# Patient Record
Sex: FEMALE | Race: WHITE | Employment: FULL TIME | URBAN - METROPOLITAN AREA
[De-identification: names, ages, dates, MRNs, and addresses within clinical notes are randomized per-mention and may not be internally consistent; named-entity substitution may affect disease eponyms.]

---

## 2017-08-15 ENCOUNTER — HOSPITAL ENCOUNTER (EMERGENCY)
Age: 42
Discharge: HOME | End: 2017-08-15
Attending: EMERGENCY MEDICINE
Payer: SELF-PAY

## 2017-08-15 VITALS
HEART RATE: 74 BPM | TEMPERATURE: 98.2 F | WEIGHT: 160.72 LBS | RESPIRATION RATE: 18 BRPM | SYSTOLIC BLOOD PRESSURE: 129 MMHG | HEIGHT: 66 IN | DIASTOLIC BLOOD PRESSURE: 97 MMHG | BODY MASS INDEX: 25.83 KG/M2 | OXYGEN SATURATION: 100 %

## 2017-08-15 DIAGNOSIS — T14.8XXA ABRASION: ICD-10-CM

## 2017-08-15 DIAGNOSIS — S00.83XA CONTUSION OF OTHER PART OF HEAD, INITIAL ENCOUNTER: Primary | ICD-10-CM

## 2017-08-15 PROCEDURE — 90471 IMMUNIZATION ADMIN: CPT

## 2017-08-15 PROCEDURE — 99283 EMERGENCY DEPT VISIT LOW MDM: CPT

## 2017-08-15 PROCEDURE — 74011250637 HC RX REV CODE- 250/637: Performed by: EMERGENCY MEDICINE

## 2017-08-15 PROCEDURE — 90715 TDAP VACCINE 7 YRS/> IM: CPT | Performed by: EMERGENCY MEDICINE

## 2017-08-15 PROCEDURE — 74011250636 HC RX REV CODE- 250/636: Performed by: EMERGENCY MEDICINE

## 2017-08-15 RX ORDER — ACETAMINOPHEN 325 MG/1
325 TABLET ORAL
Status: COMPLETED | OUTPATIENT
Start: 2017-08-15 | End: 2017-08-15

## 2017-08-15 RX ADMIN — ACETAMINOPHEN 325 MG: 325 TABLET ORAL at 07:23

## 2017-08-15 RX ADMIN — TETANUS TOXOID, REDUCED DIPHTHERIA TOXOID AND ACELLULAR PERTUSSIS VACCINE, ADSORBED 0.5 ML: 5; 2.5; 8; 8; 2.5 SUSPENSION INTRAMUSCULAR at 07:05

## 2017-08-15 NOTE — ED PROVIDER NOTES
HPI Comments: Lisa Andrade is a 42 y.o. female who presents ambulatory to ED [facility] ED with CC of left sided head and face pain after falling out of bed and hitting her head on an end table ~0540 this morning. She reports small abrasion over location of her pain, and states her pain mildly radiates to her left neck. Pt reports she was sleeping at her sister's house, and believes she fell out of the bed because she was unfamiliar with the height of the mattress. Pt denies LOC following fall, states she recalls the entire event, and has been ambulatory since incident. She states she had mild nausea en route to ED today, which has since resolved. She denies taking anticoagulants. Pt states she is unsure of the status of her Tetanus immunization. She specifically denies any fevers, chills, vomiting, chest pain, shortness of breath, rash, diarrhea, sweating or weight loss. PCP: Lawton Primrose, MD    There are no other complaints, changes, or physical findings at this time. The history is provided by the patient. No past medical history on file. No past surgical history on file. No family history on file. Social History     Social History    Marital status:      Spouse name: N/A    Number of children: N/A    Years of education: N/A     Occupational History    Not on file. Social History Main Topics    Smoking status: Not on file    Smokeless tobacco: Not on file    Alcohol use Not on file    Drug use: Not on file    Sexual activity: Not on file     Other Topics Concern    Not on file     Social History Narrative         ALLERGIES: Pcn [penicillins] and Sulfa (sulfonamide antibiotics)    Review of Systems   Constitutional: Negative. Negative for activity change, appetite change, chills, fatigue, fever and unexpected weight change. HENT: Negative for congestion, hearing loss, rhinorrhea, sneezing and voice change. +left face pain   Eyes: Negative.   Negative for pain and visual disturbance. Respiratory: Negative. Negative for apnea, cough, choking, chest tightness and shortness of breath. Cardiovascular: Negative. Negative for chest pain and palpitations. Gastrointestinal: Positive for nausea (resolved). Negative for abdominal distention, abdominal pain, blood in stool, diarrhea and vomiting. Genitourinary: Negative. Negative for difficulty urinating, flank pain, frequency and urgency. No discharge   Musculoskeletal: Negative. Negative for arthralgias, back pain, myalgias and neck stiffness. Skin: Negative. Negative for color change and rash.        +abrasion left face   Neurological: Positive for headaches (left). Negative for dizziness, seizures, syncope, speech difficulty, weakness and numbness. -LOC   Hematological: Negative for adenopathy. Psychiatric/Behavioral: Negative. Negative for agitation, behavioral problems, dysphoric mood and suicidal ideas. The patient is not nervous/anxious. Physical Exam   Constitutional: She is oriented to person, place, and time. She appears well-developed and well-nourished. No distress. HENT:   Head: Normocephalic. Head is without Rodgers's sign. Right Ear: Tympanic membrane normal. No hemotympanum. Left Ear: Tympanic membrane normal. No hemotympanum. Mouth/Throat: Oropharynx is clear and moist. No oropharyngeal exudate. 1 cm x 1 cm abrasion to left temple area; no malalignment of teeth; no evidence of TMJ dislocation or fx   Eyes: Conjunctivae and EOM are normal. Pupils are equal, round, and reactive to light. Right eye exhibits no discharge. Left eye exhibits no discharge. Neck: Normal range of motion. Neck supple. Cardiovascular: Normal rate, regular rhythm and intact distal pulses. Exam reveals no gallop and no friction rub. No murmur heard. Pulmonary/Chest: Effort normal and breath sounds normal. No respiratory distress. She has no wheezes. She has no rales.  She exhibits no tenderness. Abdominal: Soft. Bowel sounds are normal. She exhibits no distension and no mass. There is no tenderness. There is no rebound and no guarding. Musculoskeletal: Normal range of motion. She exhibits no edema. Lymphadenopathy:     She has no cervical adenopathy. Neurological: She is alert and oriented to person, place, and time. No cranial nerve deficit. Coordination normal.   Skin: Skin is warm and dry. No rash noted. No erythema. Psychiatric: She has a normal mood and affect. Nursing note and vitals reviewed. MDM  Number of Diagnoses or Management Options  Abrasion:   Contusion of other part of head, initial encounter:   Diagnosis management comments: DDx: contusion, abrasion       Amount and/or Complexity of Data Reviewed  Review and summarize past medical records: yes      ED Course       Procedures    Chief Complaint   Patient presents with    Head Injury     pt fell out of bed and hit the lateral left side of face. No LOC. A/Ox4.        6:36 AM  The patients presenting problems have been discussed, and they are in agreement with the care plan formulated and outlined with them. I have encouraged them to ask questions as they arise throughout their visit. MEDICATIONS GIVEN:  Medications   acetaminophen (TYLENOL) tablet 325 mg (not administered)   diph,Pertuss(AC),Tet Vac-PF (BOOSTRIX) suspension 0.5 mL (0.5 mL IntraMUSCular Given 8/15/17 0705)       VITAL SIGNS:  Patient Vitals for the past 12 hrs:   Temp Pulse Resp BP SpO2   08/15/17 0652 - - - (!) 129/97 -   08/15/17 0640 98.2 °F (36.8 °C) 74 18 133/84 100 %     DIAGNOSIS:    1. Contusion of other part of head, initial encounter    2.  Abrasion        PLAN:  Follow-up Information     Follow up With Details Comments Contact Info    Ector Renee MD Call As needed, If symptoms worsen [facility]             ED COURSE: The patients hospital course has been uncomplicated. 7:00 AM  Richard Garcia  results have been reviewed with her. She has been counseled regarding her diagnosis. She verbally conveys understanding and agreement of the signs, symptoms, diagnosis, treatment and prognosis and additionally agrees to follow up as recommended with Dr. Eddie Wilson MD in 24 - 48 hours. She also agrees with the care-plan and conveys that all of her questions have been answered. I have also put together some discharge instructions for her that include: 1) educational information regarding their diagnosis, 2) how to care for their diagnosis at home, as well a 3) list of reasons why they would want to return to the ED prior to their follow-up appointment, should their condition change. This note is prepared by Tamera Gibbons, acting as Scribe for [employer] Braden Forrest, [address] [facility] Braden Forrest MD: The scribe's documentation has been prepared under my direction and personally reviewed by me in its entirety. I confirm that the note above accurately reflects all work, treatment, procedures, and medical decision making performed by me.

## 2017-08-15 NOTE — ED NOTES
Instructions provided by Dr. Gerry Castellon. Ice pack provided and wound cleansed. Pt discharged home ambulatory.

## 2017-08-15 NOTE — DISCHARGE INSTRUCTIONS
Scrapes (Abrasions): Care Instructions  Your Care Instructions  Scrapes (abrasions) are wounds where your skin has been rubbed or torn off. Most scrapes do not go deep into the skin, but some may remove several layers of skin. Scrapes usually don't bleed much, but they may ooze pinkish fluid. Scrapes on the head or face may appear worse than they are. They may bleed a lot because of the good blood supply to this area. Most scrapes heal well and may not need a bandage. They usually heal within 3 to 7 days. A large, deep scrape may take 1 to 2 weeks or longer to heal. A scab may form on some scrapes. Follow-up care is a key part of your treatment and safety. Be sure to make and go to all appointments, and call your doctor if you are having problems. It's also a good idea to know your test results and keep a list of the medicines you take. How can you care for yourself at home? · If your doctor told you how to care for your wound, follow your doctor's instructions. If you did not get instructions, follow this general advice:  ¨ Wash the scrape with clean water 2 times a day. Don't use hydrogen peroxide or alcohol, which can slow healing. ¨ You may cover the scrape with a thin layer of petroleum jelly, such as Vaseline, and a nonstick bandage. ¨ Apply more petroleum jelly and replace the bandage as needed. · Prop up the injured area on a pillow anytime you sit or lie down during the next 3 days. Try to keep it above the level of your heart. This will help reduce swelling. · Be safe with medicines. Take pain medicines exactly as directed. ¨ If the doctor gave you a prescription medicine for pain, take it as prescribed. ¨ If you are not taking a prescription pain medicine, ask your doctor if you can take an over-the-counter medicine. When should you call for help?   Call your doctor now or seek immediate medical care if:  · You have signs of infection, such as:  ¨ Increased pain, swelling, warmth, or redness around the scrape. ¨ Red streaks leading from the scrape. ¨ Pus draining from the scrape. ¨ A fever. · The scrape starts to bleed, and blood soaks through the bandage. Oozing small amounts of blood is normal.  Watch closely for changes in your health, and be sure to contact your doctor if the scrape is not getting better each day. Where can you learn more? Go to http://jannet-kaleb.info/. Enter A374 in the search box to learn more about \"Scrapes (Abrasions): Care Instructions. \"  Current as of: March 20, 2017  Content Version: 11.3  © 7896-3904 [employer]. Care instructions adapted under license by [employer] (which disclaims liability or warranty for this information). If you have questions about a medical condition or this instruction, always ask your healthcare professional. [place] [address] any warranty or liability for your use of this information. Bruises: Care Instructions  Your Care Instructions    Bruises occur when small blood vessels under the skin tear or rupture, most often from a twist, bump, or fall. Blood leaks into tissues under the skin and causes a black-and-blue spot that often turns colors, including purplish black, reddish blue, or yellowish green, as the bruise heals. Bruises hurt, but most are not serious and will go away on their own within 2 to 4 weeks. Sometimes, gravity causes them to spread down the body. A leg bruise usually will take longer to heal than a bruise on the face or arms. Follow-up care is a key part of your treatment and safety. Be sure to make and go to all appointments, and call your doctor if you are having problems. Its also a good idea to know your test results and keep a list of the medicines you take. How can you care for yourself at home? · Take pain medicines exactly as directed. ¨ If the doctor gave you a prescription medicine for pain, take it as prescribed.   ¨ If you are not taking a prescription pain medicine, ask your doctor if you can take an over-the-counter medicine. · Put ice or a cold pack on the area for 10 to 20 minutes at a time. Put a thin cloth between the ice and your skin. · If you can, prop up the bruised area on pillows as much as possible for the next few days. Try to keep the bruise above the level of your heart. When should you call for help? Call your doctor now or seek immediate medical care if:  · You have signs of infection, such as:  ¨ Increased pain, swelling, warmth, or redness. ¨ Red streaks leading from the bruise. ¨ Pus draining from the bruise. ¨ A fever. · You have a bruise on your leg and signs of a blood clot, such as:  ¨ Increasing redness and swelling along with warmth, tenderness, and pain in the bruised area. ¨ Pain in your calf, back of the knee, thigh, or groin. ¨ Redness and swelling in your leg or groin. · Your pain gets worse. Watch closely for changes in your health, and be sure to contact your doctor if:  · You do not get better as expected. Where can you learn more? Go to http://jannet-kaleb.info/. Enter [phone] [phone] in the search box to learn more about \"Bruises: Care Instructions. \"  Current as of: March 20, 2017  Content Version: 11.3  © 6931-3944 [employer]. Care instructions adapted under license by [employer] (which disclaims liability or warranty for this information). If you have questions about a medical condition or this instruction, always ask your healthcare professional. [place] [address] any warranty or liability for your use of this information. Concussion: Care Instructions  Your Care Instructions    A concussion is a kind of injury to the brain. It happens when the head receives a hard blow. The impact can jar or shake the brain against the skull. This interrupts the brain's normal activities.  Although you may have cuts or bruises on your head or face, you may have no other visible signs of a brain injury. In most cases, damage to the brain from a concussion can't be seen in tests such as a CT or MRI scan. For a few weeks, you may have low energy, dizziness, trouble sleeping, a headache, ringing in your ears, or nausea. You may also feel anxious, grumpy, or depressed. You may have problems with memory and concentration. These symptoms are common after a concussion. They should slowly improve over time. Sometimes this takes weeks or even months. Someone who lives with you should know how to care for you. Please share this and all information with a caregiver who will be available to help if needed. Follow-up care is a key part of your treatment and safety. Be sure to make and go to all appointments, and call your doctor if you are having problems. It's also a good idea to know your test results and keep a list of the medicines you take. How can you care for yourself at home? Pain control  · Put ice or a cold pack on the part of your head that hurts for 10 to 20 minutes at a time. Put a thin cloth between the ice and your skin. · Be safe with medicines. Read and follow all instructions on the label. ¨ If the doctor gave you a prescription medicine for pain, take it as prescribed. ¨ If you are not taking a prescription pain medicine, ask your doctor if you can take an over-the-counter medicine. Recovery  · Follow your doctor's instructions. He or she will tell you if you need someone to watch you closely for the next 24 hours or longer. · Rest is the best way to recover from a concussion. You need to rest your body and your brain:  ¨ Get plenty of sleep at night. And take rest breaks during the day. ¨ Avoid activities that take a lot of physical or mental work. This includes housework, exercise, schoolwork, video games, text messaging, and using the computer. ¨ You may need to change your school or work schedule while you recover.   ¨ Return to your normal activities slowly. Do not try to do too much at once. · Do not drink alcohol or use illegal drugs. Alcohol and illegal drugs can slow your recovery. And they can increase your risk of a second brain injury. · Avoid activities that could lead to another concussion. Follow your doctor's instructions for a gradual return to activity and sports. · Ask your doctor when it's okay for you to drive a car, ride a bike, or operate machinery. How should you return to activity? Your return to sports or activity should be gradual. It should only begin when all symptoms of a concussion are gone, both while at rest and during exercise or exertion. Doctors and concussion specialists suggest steps to follow for returning to sports after a concussion. Use these steps as a guide. In most places, your doctor must give you written permission for your child to begin the steps and return to sports. You should slowly progress through the following levels of activity:  1. No activity. This means complete physical and mental rest.  2. Light aerobic activity. This can include walking, swimming, or other exercise at less than 70% of maximum heart rate. No resistance training is included in this step. 3. Sport-specific exercise. This includes running drills or skating drills (depending on the sport), but no head impact. 4. Noncontact training drills. This includes more complex training drills such as passing. The athlete may also begin light resistance training. 5. Full-contact practice. The athlete can participate in normal training. 6. Return to normal game play. This is the final step and allows the athlete to join in normal game play. Watch and keep track of your progress. It should take at least 6 days for you to go from light activity to normal game play. Make sure that you can stay at each new level of activity for at least 24 hours without symptoms, or as long as your doctor says, before doing more.  If one or more symptoms come back, return to a lower level of activity for at least 24 hours. Don't move on until all symptoms are gone. When should you call for help? Call 911 anytime you think you may need emergency care. For example, call if:  · You have a seizure. · You passed out (lost consciousness). · You are confused or can't stay awake. Call your doctor now or seek immediate medical care if:  · You have new or worse vomiting. · You feel less alert. · You have new weakness or numbness in any part of your body. Watch closely for changes in your health, and be sure to contact your doctor if:  · You do not get better as expected. · You have new symptoms, such as headaches, trouble concentrating, or changes in mood. Where can you learn more? Go to http://jannet-kaleb.info/. Enter F075 in the search box to learn more about \"Concussion: Care Instructions. \"  Current as of: September 20, 2016  Content Version: 11.3  © 4578-1138 [employer]. Care instructions adapted under license by [employer] (which disclaims liability or warranty for this information). If you have questions about a medical condition or this instruction, always ask your healthcare professional. [address] any warranty or liability for your use of this information.

## 2017-11-22 ENCOUNTER — OFFICE VISIT (OUTPATIENT)
Dept: ORTHOPEDIC SURGERY | Age: 42
End: 2017-11-22

## 2017-11-22 VITALS
RESPIRATION RATE: 18 BRPM | OXYGEN SATURATION: 99 % | TEMPERATURE: 98.2 F | SYSTOLIC BLOOD PRESSURE: 118 MMHG | DIASTOLIC BLOOD PRESSURE: 94 MMHG | HEIGHT: 66 IN | HEART RATE: 92 BPM

## 2017-11-22 DIAGNOSIS — M79.671 RIGHT FOOT PAIN: ICD-10-CM

## 2017-11-22 DIAGNOSIS — M77.41 METATARSALGIA OF RIGHT FOOT: Primary | ICD-10-CM

## 2017-11-22 RX ORDER — LEVOTHYROXINE AND LIOTHYRONINE 57; 13.5 UG/1; UG/1
TABLET ORAL DAILY
COMMUNITY

## 2017-11-22 NOTE — PATIENT INSTRUCTIONS
Please follow up after MRI. You are advised to contact us if your condition worsens. You have been provided with an order for durable medical equipment that you may  at an outside facility as our office does not carry the equipment you need. You may pick it up at any medical supply company you like. Listed below are a few different locations for your convenience:    [facility]  [facility], [address]  Phone: [phone]    [address] [place], [address] [facility]  Phone: [phone] [facility]  Phone: [phone]  [place]:   [address] Rayshawnsteve Mcdonald  [place], [address] [place]:  [address], [address]  [place]:  [facility],[address] [place], [address]    Metatarsalgia: Care Instructions  Your Care Instructions    Metatarsalgia (say \"met-uh-tar-SAL-nedra-\") is pain in the ball of the foot. It sometimes spreads to the toes. The ball of the foot is the bottom of the foot, where the toes join the foot. While walking might be very painful, the pain is usually not a sign of a serious or permanent problem. But any pain can affect your life, so it is important that you treat it. Pain in this area can be caused by many things. For example, you may have this pain if you stand or walk a lot or wear tight shoes or high heels. Your pain might be caused by inflammation of a joint (capsulitis). It is most common in the joint at the base of the second toe, near the ball of the foot. Capsulitis happens when ligaments that go around the joint become inflamed. The joint may be swollen. It may feel like there is a small rock under it. You may have had an X-ray if your doctor wanted to make sure a more serious problem is not causing your pain. Treatment may consist of home care, such as rest, wearing different shoes, and taking over-the-counter pain medicines.  It can take months for the pain to go away.  If the ligaments around a joint are torn, or if a toe has started to slant toward the toe next to it, you may need surgery. Follow-up care is a key part of your treatment and safety. Be sure to make and go to all appointments, and call your doctor if you are having problems. It's also a good idea to know your test results and keep a list of the medicines you take. How can you care for yourself at home? · Rest your foot. If an activity is causing the pain, find another one to do that does not put so much pressure on your foot. · Put ice or a cold pack on your foot when it hurts or after you've done something that usually causes pain. Do this for 10 to 20 minutes at a time. Put a thin cloth between the ice and your skin. · Take an over-the-counter pain medicine, such as acetaminophen (Tylenol), ibuprofen (Advil, Motrin), or naproxen (Aleve). Be safe with medicines. Read and follow all instructions on the label. · Do not take two or more pain medicines at the same time unless the doctor told you to. Many pain medicines have acetaminophen, which is Tylenol. Too much acetaminophen (Tylenol) can be harmful. · Wear roomy, comfortable shoes. · If your doctor recommends it, use special pads to relieve the pressure on your foot. The pads may fit into your shoes, or they may stick to the soles of your feet. · Ask your doctor about using orthotic shoe devices. These are molded pieces of rubber, leather, metal, plastic, or other synthetic material that are inserted into a shoe. · Wear shoes with good arch support. · Try not to wear high heels or narrow shoes. · Follow your doctor's or physical therapist's directions for exercise. When should you call for help? Watch closely for changes in your health, and be sure to contact your doctor if:  ? · You have new or worse symptoms. ? · You do not get better as expected. Where can you learn more? Go to http://jannet-kaleb.info/.   Enter W777 in the search box to learn more about \"Metatarsalgia: Care Instructions. \"  Current as of: March 21, 2017  Content Version: 11.4  © 0001-5819 [employer]. Care instructions adapted under license by [employer] (which disclaims liability or warranty for this information). If you have questions about a medical condition or this instruction, always ask your healthcare professional. [place] [address] any warranty or liability for your use of this information.

## 2017-11-22 NOTE — PROCEDURES
RADIOGRAPHIC INTERPRETATION    The impression for this/these radiograph(s) will be found in the office visit progress note for this encounter from 11/22/2017.     Venkatesh Franco MD  11/22/2017  10:54 AM

## 2017-11-22 NOTE — PROGRESS NOTES
AMBULATORY PROGRESS NOTE      Patient: Sol Bourgeois             MRN: [id number]     SSN: [id number] There is no height or weight on file to calculate BMI. YOB: 1975     AGE: 42 y.o. EX: female    PCP: Ruthie Bedoya MD    IMPRESSION/DIAGNOSIS AND TREATMENT PLAN     DIAGNOSES  1. Metatarsalgia of right foot    2. Right foot pain        Orders Placed This Encounter    AMB SUPPLY ORDER    [05807] Foot Min 3V    MRI FOOT RT WO CONT      Sol Bourgeois understands her diagnoses and the proposed plan. My overall impression in this individual is a 57-year-old female who has had right forefoot pain for many months now. Despite shoe wear changes, activity modification, she is still having pain and discomfort to her right forefoot. My impression is a right number two toe metatarsalgia, but I am also concerned about impending stress fracture to the second metatarsal distal one-third region. So, I am recommending an MRI. She has a fullness also to the plantar portion of her foot corresponding to the second metatarsal and has associated bunion, but this fullness is thicker more so and more convex on the right side compared to the more normal side on her left forefoot. So, the MRI would also allow me to see whether there is a concomitant soft tissue mass plantar and adjacent to the second metatarsal.  Indeed, the MRI is an item of medical necessity in this individual, a non-contrasting MRI of her right forefoot to assess the second metatarsal joint, second metatarsal bone, as well as the soft tissues around this area. Plan:    1) DME Order: Custom Trilaminar orthotic inserts with a metatarsal pad  2) MRI without IV Contrast of Right Foot    RTO - After MRI    HPI AND EXAMINATION     Sol Bourgeois IS A 42 y.o. female who presents to my outpatient office complaining of right foot pain. Patient states that she experiences pain along the right forefoot.  She likes to run for exercise. Last week she tried running but could not get over 1 mile without noticing discomfort to her right forefoot. Previously she was able to run around 15 miles per week about 4 months ago. A MRI has been ordered to better assess her condition, but the patient has been provided an order for custom trilaminar orthotic inserts to have after reviewing the MRI. 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 Talia Marlow is alert/oriented (name, location, time) and follows commands well. she  is in no acute distress and her affect and mood are appropriate. DIAGNOSTIC STUDIES:  Her x-rays, today, three views of the right foot, AP, lateral, and oblique, nonweightbearing, show she has a bunion deformity. There is no subluxation or dislocation of the right great toe and/or right number two toe MTP joint. There are no osteolytic or osteoblastic lesions seen and no fracture, subluxation, or dislocation. There are no calcific bone spurs to the inferior and/or superior surface of the calcaneus in the lateral radiographic x-ray. The lateral x-ray is a weightbearing film. There is some slight extension posture to the number two toe in the lateral weightbearing film. The AP film, I am uncertain whether this is a weightbearing film, and I do not think it is a weightbearing film of this right foot, but there is more of a bunion on the right compared to that on the left side. The sesamoids are malpositioned. Bilateral ANKLE and FOOT       Gait: normal  Tenderness: mild tenderness to the plantar forefoot laterally . Cutaneous: No rashes, skin patches, wounds, or abrasions to the lower legs           Warm and Normal color. No regions of expressible drainage.            Medial Border of Tibia Region: absent           Skin color, texture, turgor normal. Normal.           Fullness along the lateral plantar forefoot  Joint Motion: ROM Ankle:Normal , Hindfoot: (ST,TN,CC Normal}, Forefoot toes:Normal    Left: Laxity of first metatarsal ray  1st TMT is present  Neurologic Exam: Neuro: Motor: normal 5/5 strength in all tested muscle groups and Sensory : no sensory deficits noted. Contractures: Gastrocnemius or Achilles Contractures absent  Joint / Tendon Stability: No Ankle or Subtalar instability or joint laxity. No peroneal sublux ability or dislocation  Alignment:  Normal Foot Alignment and  Flexible             Bilateral varus alignment of number 2 toe. R > L            Bilateral bunions L > R  Vascular: Normal Pulses/ NL Capillary refill, No evidence of DVT seen on physical exam.   No calf swelling, no tenderness to calf muscles. Lymphatic:  No Evidence of Lymphedema. CHART REVIEW     Past Medical History:   Diagnosis Date    Thyroid activity decreased      Current Outpatient Prescriptions   Medication Sig    thyroid, Pork, (ARMOUR THYROID) 90 mg tablet Take  by mouth daily.  Cetirizine (ZYRTEC) 10 mg cap Take  by mouth. No current facility-administered medications for this visit. Allergies   Allergen Reactions    Pcn [Penicillins] Itching    Sulfa (Sulfonamide Antibiotics) Angioedema     History reviewed. No pertinent surgical history. Social History     Occupational History    Not on file. Social History Main Topics    Smoking status: Never Smoker    Smokeless tobacco: Never Used    Alcohol use Yes    Drug use: Not on file    Sexual activity: Not on file     Family History   Problem Relation Age of Onset    No Known Problems Mother     No Known Problems Father        REVIEW OF SYSTEMS : 11/22/2017  ALL BELOW ARE Negative except : SEE HPI       Constitutional: Negative for fever, chills and weight loss. Neg Weigh Loss  Cardiovascular: Negative for chest pain, claudication and leg swelling.  SOB, RAPP   Gastrointestinal: Negative for  pain, N/V/D/C, Blood in stool or urine,dysuria, hematuria,        Incontinence, pelvic pain  Musculoskeletal: see HPI. Neurological: Negative for dizziness and weakness. Negative for headaches,Visual Changes, Confusion, Seizures,   Psychiatric/Behavioral: Negative for depression, memory loss and substance abuse. Extremities:  Negative for  hair changes, rash or skin lesion changes. Hematologic: Negative for Bleeding problems, bruising, pallor or swollen lymph nodes. Peripheral Vascular: No calf pain, vascular vein tenderness to calf pain              No calf throbbing, posterior knee throbbing pain    DIAGNOSTIC IMAGING     RADIOGRAPHIC INTERPRETATION    The impression for this/these radiograph(s) will be found in the office visit progress note for this encounter from 11/22/2017. Sharmila Murrieta MD  11/22/2017  10:54 AM          Written by Celena Madison, as dictated by Drew Mooney MD. IDr., Drew Mooney MD, confirm that all documentation is accurate.

## 2017-12-13 ENCOUNTER — HOSPITAL ENCOUNTER (OUTPATIENT)
Age: 42
Discharge: HOME | End: 2017-12-13
Attending: ORTHOPAEDIC SURGERY
Payer: COMMERCIAL

## 2017-12-13 DIAGNOSIS — M79.671 RIGHT FOOT PAIN: ICD-10-CM

## 2017-12-13 DIAGNOSIS — M77.41 METATARSALGIA OF RIGHT FOOT: ICD-10-CM

## 2017-12-13 PROCEDURE — 73720 MRI LWR EXTREMITY W/O&W/DYE: CPT

## 2017-12-13 PROCEDURE — A9585 GADOBUTROL INJECTION: HCPCS | Performed by: ORTHOPAEDIC SURGERY

## 2017-12-13 PROCEDURE — 74011250636 HC RX REV CODE- 250/636: Performed by: ORTHOPAEDIC SURGERY

## 2017-12-13 RX ADMIN — GADOBUTROL 7.5 ML: 604.72 INJECTION INTRAVENOUS at 12:00

## 2017-12-20 ENCOUNTER — OFFICE VISIT (OUTPATIENT)
Dept: ORTHOPEDIC SURGERY | Age: 42
End: 2017-12-20

## 2017-12-20 VITALS — BODY MASS INDEX: 25.71 KG/M2 | WEIGHT: 160 LBS | HEIGHT: 66 IN

## 2017-12-20 DIAGNOSIS — M77.41 METATARSALGIA OF RIGHT FOOT: ICD-10-CM

## 2017-12-20 DIAGNOSIS — M77.51 BURSITIS OF RIGHT FOOT: Primary | ICD-10-CM

## 2017-12-20 DIAGNOSIS — M79.671 RIGHT FOOT PAIN: ICD-10-CM

## 2017-12-20 RX ORDER — TRIAMCINOLONE ACETONIDE 40 MG/ML
40 INJECTION, SUSPENSION INTRA-ARTICULAR; INTRAMUSCULAR ONCE
Qty: 1 ML | Refills: 0
Start: 2017-12-20 | End: 2017-12-20

## 2017-12-20 RX ORDER — LIDOCAINE HYDROCHLORIDE 10 MG/ML
INJECTION, SOLUTION EPIDURAL; INFILTRATION; INTRACAUDAL; PERINEURAL
Qty: 1.5 ML | Refills: 0
Start: 2017-12-20

## 2017-12-20 NOTE — PROCEDURES
PROCEDURE       [facility] PROCEDURE OUTPATIENT PROCEDURE    PROCEDURE:  Injection of the RIGHT 1ST INTERSTITIAL WEB SPACE BURSAE        I, Marvin Gómez MD, have reviewed the History, Physical and updated the Allergic reactions for [address] performed immediately prior to start of procedure:       * Patient was identified by name Alice Madera and date of birth (1975)  * Agreement on procedure being performed was verified  * Risks and Benefits explained to the patient: see below  * Procedure site verified and marked as necessary  * Patient was positioned for comfort  * Verbal Consent and Written Consent Verified by myself and my office staff. * Complications: None  *  All patient and/or family questions answered     The procedure was explained to the patient and possible adverse reactions were discussed. These risks included, but not limited to: bleeding, infection, septic arthritis, local skin irritation (skin discoloration, hyperpigmentation, hypopigmentation, thinning of the skin, development of a wound, skin necrosis), synovitis, subcutaneous fat pad atrophy, subcutaneous abscess, tendon rupture, transient hyperglycemia. Infection may occur requiring surgical debridement and hospitalization. All Diabetics instructed to check serum blood sugar levels 3 times a day (day of the injection) due to hyperglycemia induced from cortisone injections. If serum blood sugar level > 250 mg%, Tila Wright instructed to call PCP to determine if any additional measures are needed. Time: 8:22 AM  Date of procedure: 12/20/2017  Procedure performed by: Marvin Gómez MD  Provider assisted by:  MA  Patient accompanied by: self  How tolerated by patient: tolerated the procedure well with no complications  Comments: none    The RIGHT 1ST INTERSTITIAL WEB SPACE BURSAE  area was prepped and cleansed with: sterile fashion using a following solution:  Betadine.  The injection was administered:  A solution of 20 mg of Kenalog and 1.5 ml of 1% plain lidocaine% plain was used. Post injection instructions were given to Betty Roa: Remove the band aid in 3 hours, Wash site with clean soap, No further dressings there after. Call Noemi Caraballo  [phone] if any: pain, redness, drainage, fever, or any concerns/questions that Betty Roa may have regarding the injection. Betty Roa was advised on the signs of infection and instructed to go to the ER if it is office after hours. Angelique Wright tolerated the injection quite well.     Noemi Caraballo MD MD  8:22 AM

## 2017-12-20 NOTE — PROGRESS NOTES
AMBULATORY PROGRESS NOTE      Patient: Tootie Garcia             MRN: [id number]     SSN: [id number] Body mass index is 25.82 kg/(m^2). YOB: 1975     AGE: 36 y.o. EX: female    PCP: Neetu Castillo MD    IMPRESSION/DIAGNOSIS AND TREATMENT PLAN     DIAGNOSES  1. Bursitis of right foot    2. Metatarsalgia of right foot    3. Right foot pain        Orders Placed This Encounter    INJECT TENDON SHEATH/LIGAMENT    TRIAMCINOLONE ACETONIDE INJ    triamcinolone acetonide (KENALOG) 40 mg/mL injection    lidocaine, PF, (XYLOCAINE) 10 mg/mL (1 %) injection      Tootie Garcia understands her diagnoses and the proposed plan. I showed her the MRI, at least pertinent images thereof, and it does show a fluid collection is her first interdigital web space that is affecting the first web space, and it is adjacent to the second metatarsal head, and then is swings down to the plantar portion of the second metatarsal head region. This is the direct region where she has tenderness. Her MRI did not show a stress fracture. It did not show any Freibergs infarction. Because of her continued pain and discomfort in this region, recommendation is for a cortisone injection to this region. A cortisone injection was performed as listed as below. The plan is to see her in one months time. I did recommend OBEO shoes, as well as a prescription for a custom orthotic. Her examination is as below. There is no instability of the number two toe MTP region. Plan:    1) Cortisone Injection to the Right First Web Space plantar aspect: 0.5 mL Kenalog and 1.5 mL of Lidocaine  2)  No running, no lunging, and no plyometric type activities. Just walking for now. RTO - 4 weeks    HPI AND EXAMINATION     Tootie Garcia IS A 36 y.o. female who presents to my outpatient office for follow up of metatarsalgia of the right foot.  At last visit, I provided an order for a custom trilaminar orthotic inserts with a metatarsal pad to be used after, and ordered a MRI of the right foot. The patient presents to the office today reporting worsening pain along her metatarsals. Patient states that she has been unable walk barefooted without discomfort. She notes that on the last Sunday she noticed moderate swelling to her right plantar forefoot and more pain than usual. At that time she took Aleve which provided relief. Bilateral ANKLE and FOOT        Gait: normal  Tenderness: Mild tenderness to the right first web space along the 2nd metatarsal head. Cutaneous: No rashes, skin patches, wounds, or abrasions to the lower legs                                Warm and Normal color. No regions of expressible drainage. Medial Border of Tibia Region: absent                                Skin color, texture, turgor normal. Normal.                                Fullness along the lateral plantar forefoot  Joint Motion: ROM Ankle:Normal , Hindfoot: (ST,TN,CC Normal}, Forefoot toes:Normal                                              Left: Laxity of first metatarsal ray  1st TMT is present  Neurologic Exam: Neuro: Motor: normal 5/5 strength in all tested muscle groups and Sensory : no sensory deficits noted. Contractures: Gastrocnemius or Achilles Contractures absent  Joint / Tendon Stability: No Ankle or Subtalar instability or joint laxity. No peroneal sublux ability or dislocation  Alignment:  Normal Foot Alignment and  Flexible                                  Bilateral varus alignment of number 2 toe. R > L                                 Bilateral bunions L > R  Vascular: Normal Pulses/ NL Capillary refill, No evidence of DVT seen on physical exam.                        No calf swelling, no tenderness to calf muscles. Lymphatic:  No Evidence of Lymphedema.     CHART REVIEW     Past Medical History:   Diagnosis Date  Thyroid activity decreased      Current Outpatient Prescriptions   Medication Sig    triamcinolone acetonide (KENALOG) 40 mg/mL injection 1 mL by IntraMUSCular route once for 1 dose.  lidocaine, PF, (XYLOCAINE) 10 mg/mL (1 %) injection 1.5 mL of Lidocaine to first web space plantar aspect    thyroid, Pork, (ARMOUR THYROID) 90 mg tablet Take  by mouth daily.  Cetirizine (ZYRTEC) 10 mg cap Take  by mouth. No current facility-administered medications for this visit. Allergies   Allergen Reactions    Pcn [Penicillins] Itching    Sulfa (Sulfonamide Antibiotics) Angioedema     History reviewed. No pertinent surgical history. Social History     Occupational History    Not on file. Social History Main Topics    Smoking status: Never Smoker    Smokeless tobacco: Never Used    Alcohol use Yes    Drug use: Not on file    Sexual activity: Not on file     Family History   Problem Relation Age of Onset    No Known Problems Mother     No Known Problems Father        REVIEW OF SYSTEMS : 12/20/2017  ALL BELOW ARE Negative except : SEE HPI       Constitutional: Negative for fever, chills and weight loss. Neg Weigh Loss  Cardiovascular: Negative for chest pain, claudication and leg swelling. SOB, RAPP   Gastrointestinal: Negative for  pain, N/V/D/C, Blood in stool or urine,dysuria, hematuria,        Incontinence, pelvic pain  Musculoskeletal: see HPI. Neurological: Negative for dizziness and weakness. Negative for headaches,Visual Changes, Confusion, Seizures,   Psychiatric/Behavioral: Negative for depression, memory loss and substance abuse. Extremities:  Negative for  hair changes, rash or skin lesion changes. Hematologic: Negative for Bleeding problems, bruising, pallor or swollen lymph nodes.   Peripheral Vascular: No calf pain, vascular vein tenderness to calf pain              No calf throbbing, posterior knee throbbing pain    DIAGNOSTIC IMAGING        PROCEDURE       [facility]EDURE OUTPATIENT PROCEDURE    PROCEDURE:  Injection of the RIGHT 1ST INTERSTITIAL WEB SPACE BURSAE        I, Yaneth Redman MD, have reviewed the History, Physical and updated the Allergic reactions for [address] performed immediately prior to start of procedure:       * Patient was identified by name Shireen Munoz and date of birth (1975)  * Agreement on procedure being performed was verified  * Risks and Benefits explained to the patient: see below  * Procedure site verified and marked as necessary  * Patient was positioned for comfort  * Verbal Consent and Written Consent Verified by myself and my office staff. * Complications: None  *  All patient and/or family questions answered     The procedure was explained to the patient and possible adverse reactions were discussed. These risks included, but not limited to: bleeding, infection, septic arthritis, local skin irritation (skin discoloration, hyperpigmentation, hypopigmentation, thinning of the skin, development of a wound, skin necrosis), synovitis, subcutaneous fat pad atrophy, subcutaneous abscess, tendon rupture, transient hyperglycemia. Infection may occur requiring surgical debridement and hospitalization. All Diabetics instructed to check serum blood sugar levels 3 times a day (day of the injection) due to hyperglycemia induced from cortisone injections. If serum blood sugar level > 250 mg%, Tila Wright instructed to call PCP to determine if any additional measures are needed. Time: 8:22 AM  Date of procedure: 12/20/2017  Procedure performed by: Yaneth Redman MD  Provider assisted by:  MA  Patient accompanied by: self  How tolerated by patient: tolerated the procedure well with no complications  Comments: none    The RIGHT 1ST INTERSTITIAL WEB SPACE BURSAE  area was prepped and cleansed with: sterile fashion using a following solution:  Betadine.  The injection was administered:  A solution of 20 mg of Kenalog and 1.5 ml of 1% plain lidocaine% plain was used. Post injection instructions were given to Sol Bourgeois: Remove the band aid in 3 hours, Wash site with clean soap, No further dressings there after. Call Loy Seth  [phone] if any: pain, redness, drainage, fever, or any concerns/questions that Sol Esperanzakelsey may have regarding the injection. Solfaye Matakelsey was advised on the signs of infection and instructed to go to the ER if it is office after hours. Savanna Wright tolerated the injection quite well. Loy Seth MD MD  8:22 AM       MRI Results (most recent):    Results from [place] encounter on 12/13/17   MRI FOOT RT W  WO CONT   Narrative EXAM: MRI of the right foot with and without contrast     INDICATION: Metatarsalgia of the second toe. TECHNIQUE: MRI of the right foot with and without contrast. Multiplanar  multisequence MR imaging obtained pre and post IV contrast.    IV Contrast: 7.5 cc Gadavist    COMPARISON: None    FINDINGS:   Osseous Structures: The marrow signal is unremarkable. No evidence of an acute  fracture. No abnormal marrow signal. No cortical abnormalities appreciated. Osteophyte formation of the first metatarsal is noted. A minute subchondral cyst  is noted in the metatarsal head. Joints/Cartilage: The first MTP joint demonstrates hallux valgus. There is  asymmetric joint space narrowing and mild cartilage thinning. The remainder the  visualized joint spaces are grossly unremarkable. Osteophyte formation is  present. Osteophyte formation and cartilage thinning are noted in the first  metatarsal sesamoid articulations. Ligaments: No acute ligamentous pathology appreciated. Tendons/Muscle: The flexor and extensor tendons are unremarkable. The  musculature demonstrates normal signal.    Soft Tissues/Other:  Between the first and second metatarsal heads, there is a  0.5 x 2.4 x 1.8 cm rim-enhancing bursa. This partially extends inferior to the  2nd metatarsal head. This is concerning for adventitial bursitis. Impression IMPRESSION:   1. Adventitial bursitis adjacent the 2nd metatarsal head. 2.  No evidence of stress edema or stress fracture. 3.  Mild hallux valgus with mild to moderate secondary osteoarthritis and mild  osteoarthritis of the 1st metatarsal sesamoid articulations. Written by Jory Eisenmenger, as dictated by Zafar Arana MD. I, , Zafar Arana MD, confirm that all documentation is accurate.

## 2017-12-20 NOTE — MR AVS SNAPSHOT
Visit Information Date & Time Provider Department Dept. Phone Encounter #  
 12/20/2017  7:35 AM Iris Lou, [address] [facility] [phone] Upcoming Health Maintenance Date Due  
 PAP AKA CERVICAL CYTOLOGY 5/8/2015 Influenza Age 5 to Adult 8/1/2017 DTaP/Tdap/Td series (2 - Td) 8/15/2027 Allergies as of 12/20/2017  Review Complete On: 12/20/2017 By: Iris Lou MD  
  
 Severity Noted Reaction Type Reactions Pcn [Penicillins]  08/15/2017    Itching Sulfa (Sulfonamide Antibiotics)  08/15/2017    Angioedema Current Immunizations  Never Reviewed Name Date Tdap 8/15/2017  7:05 AM  
  
 Not reviewed this visit You Were Diagnosed With   
  
 Codes Comments Metatarsalgia of right foot    -  Primary ICD-10-CM: M77.41 
ICD-9-CM: 726.70 Right foot pain     ICD-10-CM: M79.671 ICD-9-CM: 729.5 Vitals Height(growth percentile) Weight(growth percentile) BMI OB Status Smoking Status 5' 6\" (1.676 m) 160 lb (72.6 kg) 25.82 kg/m2 Chemically Induced Never Smoker BMI and BSA Data Body Mass Index Body Surface Area  
 25.82 kg/m 2 1.84 m 2 Your Updated Medication List  
  
   
This list is accurate as of: 12/20/17  8:15 AM.  Always use your most recent med list.  
  
  
  
  
 ARMOUR THYROID 90 mg tablet Generic drug:  thyroid (Pork) Take  by mouth daily. ZyrTEC 10 mg Cap Generic drug:  Cetirizine Take  by mouth. Patient Instructions Please follow up in 4 weeks. You are advised to contact us if your condition worsens. Please consider purchasing Metatarsalgia: Care Instructions Your Care Instructions Metatarsalgia (say \"met-uh-tar-SAL-nedra-uh\") is pain in the ball of the foot. It sometimes spreads to the toes. The ball of the foot is the bottom of the foot, where the toes join the foot. While walking might be very painful, the pain is usually not a sign of a serious or permanent problem. But any pain can affect your life, so it is important that you treat it. Pain in this area can be caused by many things. For example, you may have this pain if you stand or walk a lot or wear tight shoes or high heels. Your pain might be caused by inflammation of a joint (capsulitis). It is most common in the joint at the base of the second toe, near the ball of the foot. Capsulitis happens when ligaments that go around the joint become inflamed. The joint may be swollen. It may feel like there is a small rock under it. You may have had an X-ray if your doctor wanted to make sure a more serious problem is not causing your pain. Treatment may consist of home care, such as rest, wearing different shoes, and taking over-the-counter pain medicines. It can take months for the pain to go away. If the ligaments around a joint are torn, or if a toe has started to slant toward the toe next to it, you may need surgery. Follow-up care is a key part of your treatment and safety. Be sure to make and go to all appointments, and call your doctor if you are having problems. It's also a good idea to know your test results and keep a list of the medicines you take. How can you care for yourself at home? · Rest your foot. If an activity is causing the pain, find another one to do that does not put so much pressure on your foot. · Put ice or a cold pack on your foot when it hurts or after you've done something that usually causes pain. Do this for 10 to 20 minutes at a time. Put a thin cloth between the ice and your skin. · Take an over-the-counter pain medicine, such as acetaminophen (Tylenol), ibuprofen (Advil, Motrin), or naproxen (Aleve). Be safe with medicines. Read and follow all instructions on the label.  
· Do not take two or more pain medicines at the same time unless the doctor told you to. Many pain medicines have acetaminophen, which is Tylenol. Too much acetaminophen (Tylenol) can be harmful. · Wear roomy, comfortable shoes. · If your doctor recommends it, use special pads to relieve the pressure on your foot. The pads may fit into your shoes, or they may stick to the soles of your feet. · Ask your doctor about using orthotic shoe devices. These are molded pieces of rubber, leather, metal, plastic, or other synthetic material that are inserted into a shoe. · Wear shoes with good arch support. · Try not to wear high heels or narrow shoes. · Follow your doctor's or physical therapist's directions for exercise. When should you call for help? Watch closely for changes in your health, and be sure to contact your doctor if: 
? · You have new or worse symptoms. ? · You do not get better as expected. Where can you learn more? Go to http://jannet-kaleb.info/. Enter L769 in the search box to learn more about \"Metatarsalgia: Care Instructions. \" Current as of: March 21, 2017 Content Version: 11.4 © 9071-1291 Exalt [employer]. Care instructions adapted under license by W[employer] (which disclaims liability or warranty for this information). If you have questions about a medical condition or this instruction, always ask your healthcare professional. [address] any warranty or liability for your use of this information. Metatarsalgia: Care Instructions Your Care Instructions Metatarsalgia (say \"met-uh-tar-SAL-nedra-uh\") is pain in the ball of the foot. It sometimes spreads to the toes. The ball of the foot is the bottom of the foot, where the toes join the foot. While walking might be very painful, the pain is usually not a sign of a serious or permanent problem. But any pain can affect your life, so it is important that you treat it. Pain in this area can be caused by many things. For example, you may have this pain if you stand or walk a lot or wear tight shoes or high heels. Your pain might be caused by inflammation of a joint (capsulitis). It is most common in the joint at the base of the second toe, near the ball of the foot. Capsulitis happens when ligaments that go around the joint become inflamed. The joint may be swollen. It may feel like there is a small rock under it. You may have had an X-ray if your doctor wanted to make sure a more serious problem is not causing your pain. Treatment may consist of home care, such as rest, wearing different shoes, and taking over-the-counter pain medicines. It can take months for the pain to go away. If the ligaments around a joint are torn, or if a toe has started to slant toward the toe next to it, you may need surgery. Follow-up care is a key part of your treatment and safety. Be sure to make and go to all appointments, and call your doctor if you are having problems. It's also a good idea to know your test results and keep a list of the medicines you take. How can you care for yourself at home? · Rest your foot. If an activity is causing the pain, find another one to do that does not put so much pressure on your foot. · Put ice or a cold pack on your foot when it hurts or after you've done something that usually causes pain. Do this for 10 to 20 minutes at a time. Put a thin cloth between the ice and your skin. · Take an over-the-counter pain medicine, such as acetaminophen (Tylenol), ibuprofen (Advil, Motrin), or naproxen (Aleve). Be safe with medicines. Read and follow all instructions on the label. · Do not take two or more pain medicines at the same time unless the doctor told you to. Many pain medicines have acetaminophen, which is Tylenol. Too much acetaminophen (Tylenol) can be harmful. · Wear roomy, comfortable shoes. · If your doctor recommends it, use special pads to relieve the pressure on your foot. The pads may fit into your shoes, or they may stick to the soles of your feet. · Ask your doctor about using orthotic shoe devices. These are molded pieces of rubber, leather, metal, plastic, or other synthetic material that are inserted into a shoe. · Wear shoes with good arch support. · Try not to wear high heels or narrow shoes. · Follow your doctor's or physical therapist's directions for exercise. When should you call for help? Watch closely for changes in your health, and be sure to contact your doctor if: 
? · You have new or worse symptoms. ? · You do not get better as expected. Where can you learn more? Go to http://jannet-kaleb.info/. Enter S216 in the search box to learn more about \"Metatarsalgia: Care Instructions. \" Current as of: March 21, 2017 Content Version: 11.4 © 4125-0201 [employer]bay. Care instructions adapted under license by Syner[employer] (which disclaims liability or warranty for this information). If you have questions about a medical condition or this instruction, always ask your healthcare professional. [place] [address] any warranty or liability for your use of this information. Introducing [facility] & [facility]! [employer] introduces Spotlight patient portal. Now you can access parts of your medical record, email your doctor's office, and request medication refills online. 1. In your internet browser, go to [email or web address] 2. Click on the First Time User? Click Here link in the Sign In box. You will see the New Member Sign Up page. 3. Enter your Spotlight Access Code exactly as it appears below. You will not need to use this code after youve completed the sign-up process. If you do not sign up before the expiration date, you must request a new code.  
 
· Spotlight Access Code: [email or web address] 
 Expires: 2/20/2018 11:16 AM 
 
4. Enter the last four digits of your Social Security Number (xxxx) and Date of Birth (mm/dd/yyyy) as indicated and click Submit. You will be taken to the next sign-up page. 5. Create a Alchip ID. This will be your Alchip login ID and cannot be changed, so think of one that is secure and easy to remember. 6. Create a Alchip password. You can change your password at any time. 7. Enter your Password Reset Question and Answer. This can be used at a later time if you forget your password. 8. Enter your e-mail address. You will receive e-mail notification when new information is available in [address] 9. Click Sign Up. You can now view and download portions of your medical record. 10. Click the Download Summary menu link to download a portable copy of your medical information. If you have questions, please visit the Frequently Asked Questions section of the Alchip website. Remember, Alchip is NOT to be used for urgent needs. For medical emergencies, dial 911. Now available from your iPhone and Android! Please provide this summary of care documentation to your next provider. Your primary care clinician is listed as Tony Mcclure. If you have any questions after today's visit, please call [phone].

## 2017-12-20 NOTE — PATIENT INSTRUCTIONS
Please follow up in 4 weeks. You are advised to contact us if your condition worsens. Please consider purchasing Abeo flip flops. Metatarsalgia: Care Instructions  Your Care Instructions    Metatarsalgia (say \"met-misael-tar-SAL-nedra-misael\") is pain in the ball of the foot. It sometimes spreads to the toes. The ball of the foot is the bottom of the foot, where the toes join the foot. While walking might be very painful, the pain is usually not a sign of a serious or permanent problem. But any pain can affect your life, so it is important that you treat it. Pain in this area can be caused by many things. For example, you may have this pain if you stand or walk a lot or wear tight shoes or high heels. Your pain might be caused by inflammation of a joint (capsulitis). It is most common in the joint at the base of the second toe, near the ball of the foot. Capsulitis happens when ligaments that go around the joint become inflamed. The joint may be swollen. It may feel like there is a small rock under it. You may have had an X-ray if your doctor wanted to make sure a more serious problem is not causing your pain. Treatment may consist of home care, such as rest, wearing different shoes, and taking over-the-counter pain medicines. It can take months for the pain to go away. If the ligaments around a joint are torn, or if a toe has started to slant toward the toe next to it, you may need surgery. Follow-up care is a key part of your treatment and safety. Be sure to make and go to all appointments, and call your doctor if you are having problems. It's also a good idea to know your test results and keep a list of the medicines you take. How can you care for yourself at home? · Rest your foot. If an activity is causing the pain, find another one to do that does not put so much pressure on your foot. · Put ice or a cold pack on your foot when it hurts or after you've done something that usually causes pain.  Do this for 10 to 20 minutes at a time. Put a thin cloth between the ice and your skin. · Take an over-the-counter pain medicine, such as acetaminophen (Tylenol), ibuprofen (Advil, Motrin), or naproxen (Aleve). Be safe with medicines. Read and follow all instructions on the label. · Do not take two or more pain medicines at the same time unless the doctor told you to. Many pain medicines have acetaminophen, which is Tylenol. Too much acetaminophen (Tylenol) can be harmful. · Wear roomy, comfortable shoes. · If your doctor recommends it, use special pads to relieve the pressure on your foot. The pads may fit into your shoes, or they may stick to the soles of your feet. · Ask your doctor about using orthotic shoe devices. These are molded pieces of rubber, leather, metal, plastic, or other synthetic material that are inserted into a shoe. · Wear shoes with good arch support. · Try not to wear high heels or narrow shoes. · Follow your doctor's or physical therapist's directions for exercise. When should you call for help? Watch closely for changes in your health, and be sure to contact your doctor if:  ? · You have new or worse symptoms. ? · You do not get better as expected. Where can you learn more? Go to http://jannet-kaleb.info/. Enter H316 in the search box to learn more about \"Metatarsalgia: Care Instructions. \"  Current as of: March 21, 2017  Content Version: 11.4  © 1889-3290 [employer]. Care instructions adapted under license by Shippter (which disclaims liability or warranty for this information). If you have questions about a medical condition or this instruction, always ask your healthcare professional. [address] any warranty or liability for your use of this information.

## 2018-01-23 ENCOUNTER — OFFICE VISIT (OUTPATIENT)
Dept: ORTHOPEDIC SURGERY | Age: 43
End: 2018-01-23

## 2018-01-23 VITALS
TEMPERATURE: 98 F | WEIGHT: 167 LBS | BODY MASS INDEX: 26.84 KG/M2 | HEIGHT: 66 IN | HEART RATE: 64 BPM | SYSTOLIC BLOOD PRESSURE: 105 MMHG | OXYGEN SATURATION: 99 % | DIASTOLIC BLOOD PRESSURE: 77 MMHG

## 2018-01-23 DIAGNOSIS — M77.51 BURSITIS OF RIGHT FOOT: ICD-10-CM

## 2018-01-23 DIAGNOSIS — M77.41 METATARSALGIA OF RIGHT FOOT: Primary | ICD-10-CM

## 2018-01-23 NOTE — MR AVS SNAPSHOT
[address] [place] [address], [address] [facility] 
[phone] Patient: Kasey Mccauley MRN: [id number] OYI:43/2/1862 Visit Information Date & Time Provider Department Dept. Phone Encounter #  
 1/23/2018  9:10 AM Ofelia Zuluaga MD [place] [facility] [phone] [id number] Upcoming Health Maintenance Date Due  
 PAP AKA CERVICAL CYTOLOGY 5/8/2015 Influenza Age 5 to Adult 8/1/2017 DTaP/Tdap/Td series (2 - Td) 8/15/2027 Allergies as of 1/23/2018  Review Complete On: 1/23/2018 By: Ofelia Zuluaga MD  
  
 Severity Noted Reaction Type Reactions Pcn [Penicillins]  08/15/2017    Itching Sulfa (Sulfonamide Antibiotics)  08/15/2017    Angioedema Current Immunizations  Never Reviewed Name Date Tdap 8/15/2017  7:05 AM  
  
 Not reviewed this visit Vitals BP Pulse Temp Height(growth percentile) Weight(growth percentile) SpO2  
 105/77 (BP 1 Location: Left arm, BP Patient Position: Sitting) 64 98 °F (36.7 °C) 5' 6\" (1.676 m) 167 lb (75.8 kg) 99% BMI OB Status Smoking Status 26.95 kg/m2 Chemically Induced Never Smoker BMI and BSA Data Body Mass Index Body Surface Area  
 26.95 kg/m 2 1.88 m 2 Your Updated Medication List  
  
   
This list is accurate as of: 1/23/18  9:53 AM.  Always use your most recent med list.  
  
  
  
  
 ARMOUR THYROID 90 mg tablet Generic drug:  thyroid (Pork) Take  by mouth daily. lidocaine (PF) 10 mg/mL (1 %) injection Commonly known as:  XYLOCAINE  
1.5 mL of Lidocaine to first web space plantar aspect ZyrTEC 10 mg Cap Generic drug:  Cetirizine Take  by mouth. Patient Instructions Please follow up as needed. You are advised to contact us if your condition worsens. Foot Pain: Care Instructions Your Care Instructions Foot injuries that cause pain and swelling are fairly common. Almost all sports or home repair projects can cause a misstep that ends up as foot pain. Normal wear and tear, especially as you get older, also can cause foot pain. Most minor foot injuries will heal on their own, and home treatment is usually all you need to do. If you have a severe injury, you may need tests and treatment. Follow-up care is a key part of your treatment and safety. Be sure to make and go to all appointments, and call your doctor if you are having problems. It's also a good idea to know your test results and keep a list of the medicines you take. How can you care for yourself at home? · Take pain medicines exactly as directed. ¨ If the doctor gave you a prescription medicine for pain, take it as prescribed. ¨ If you are not taking a prescription pain medicine, ask your doctor if you can take an over-the-counter medicine. · Rest and protect your foot. Take a break from any activity that may cause pain. · Put ice or a cold pack on your foot for 10 to 20 minutes at a time. Put a thin cloth between the ice and your skin. · Prop up the sore foot on a pillow when you ice it or anytime you sit or lie down during the next 3 days. Try to keep it above the level of your heart. This will help reduce swelling. · Your doctor may recommend that you wrap your foot with an elastic bandage. Keep your foot wrapped for as long as your doctor advises. · If your doctor recommends crutches, use them as directed. · Wear roomy footwear. · As soon as pain and swelling end, begin gentle exercises of your foot. Your doctor can tell you which exercises will help. When should you call for help? Call 911 anytime you think you may need emergency care. For example, call if: 
? · Your foot turns pale, white, blue, or cold. ?Call your doctor now or seek immediate medical care if: 
? · You cannot move or stand on your foot. ? · Your foot looks twisted or out of its normal position. ? · Your foot is not stable when you step down. ? · You have signs of infection, such as: 
¨ Increased pain, swelling, warmth, or redness. ¨ Red streaks leading from the sore area. ¨ Pus draining from a place on your foot. ¨ A fever. ? · Your foot is numb or tingly. ? Watch closely for changes in your health, and be sure to contact your doctor if: 
? · You do not get better as expected. ? · You have bruises from an injury that last longer than 2 weeks. Where can you learn more? Go to http://jannet-kaleb.info/. Enter U930 in the search box to learn more about \"Foot Pain: Care Instructions. \" Current as of: March 21, 2017 Content Version: 11.4 © 7084-5713 [employer]. Care instructions adapted under license by Seven Technologies (which disclaims liability or warranty for this information). If you have questions about a medical condition or this instruction, always ask your healthcare professional. [place] [address] any warranty or liability for your use of this information. Introducing [facility] & HEALTH SERVICES! Jana Hdez introduces SnappCloud patient portal. Now you can access parts of your medical record, email your doctor's office, and request medication refills online. 1. In your internet browser, go to [email or web address] 2. Click on the First Time User? Click Here link in the Sign In box. You will see the New Member Sign Up page. 3. Enter your SnappCloud Access Code exactly as it appears below. You will not need to use this code after youve completed the sign-up process. If you do not sign up before the expiration date, you must request a new code. · SnappCloud Access Code: [email or web address] Expires: 2/20/2018 11:16 AM 
 
4. Enter the last four digits of your Social Security Number (xxxx) and Date of Birth (mm/dd/yyyy) as indicated and click Submit.  You will be taken to the next sign-up page. 5. Create a Dynamixyz ID. This will be your Dynamixyz login ID and cannot be changed, so think of one that is secure and easy to remember. 6. Create a Dynamixyz password. You can change your password at any time. 7. Enter your Password Reset Question and Answer. This can be used at a later time if you forget your password. 8. Enter your e-mail address. You will receive e-mail notification when new information is available in [address] 9. Click Sign Up. You can now view and download portions of your medical record. 10. Click the Download Summary menu link to download a portable copy of your medical information. If you have questions, please visit the Frequently Asked Questions section of the Dynamixyz website. Remember, Dynamixyz is NOT to be used for urgent needs. For medical emergencies, dial 911. Now available from your iPhone and Android! Please provide this summary of care documentation to your next provider. Your primary care clinician is listed as Eddie Hayes. If you have any questions after today's visit, please call [phone].

## 2018-01-23 NOTE — PROGRESS NOTES
AMBULATORY PROGRESS NOTE      Patient: Tamera Robert             MRN: [id number]     SSN: [id number] Body mass index is 26.95 kg/(m^2). YOB: 1975     AGE: 36 y.o. EX: female    PCP: Yeny Foote MD    IMPRESSION/DIAGNOSIS AND TREATMENT PLAN     DIAGNOSES  1. Metatarsalgia of right foot    2. Bursitis of right foot        No orders of the defined types were placed in this encounter. Tamera Robert understands her diagnoses and the proposed plan. Plan:    1) Continue activity modification as directed. 2) Follow up with Herbert Oliva to notify us how resuming activities went. RTO - PRN    HPI AND EXAMINATION     Tamera Robert IS A 36 y.o. female who presents to my outpatient office for follow up of metatarsalgia of the right foot. At last visit, I injected Cortisone to the right first webspace. Patient reports that she had some improvement since her last visit. Patient went to a convention around 2 weeks ago. She noticed a sensation of a fullness along the right forefoot. When she wears sandals or platform shoes it tends to do better. However, wearing heels or similar shoes elicits the same sensation and causes discomfort. She inquired about returning to running again, and has been instructed to wear supportive shoes     Bilateral ANKLE and FOOT         Gait: normal  Tenderness: Mild tenderness to the right first web space along the 2nd metatarsal head. Cutaneous: No rashes, skin patches, wounds, or abrasions to the lower legs                                Warm and Normal color.  No regions of expressible drainage.                                Medial Border of Tibia Region: absent                                Skin color, texture, turgor normal. Normal.                                Fullness along the lateral plantar forefoot  Joint Motion: ROM Ankle:Normal , Hindfoot: (ST,TN,CC Normal}, Forefoot toes:Normal                                              Left: Laxity of first metatarsal ray  1st TMT is present  Neurologic Exam: Neuro: Motor: normal 5/5 strength in all tested muscle groups and Sensory : no sensory deficits noted. Contractures: Gastrocnemius or Achilles Contractures absent  Joint / Tendon Stability: No Ankle or Subtalar instability or joint laxity.                                                               No peroneal sublux ability or dislocation  Alignment:  Normal Foot Alignment and  Flexible                                  Bilateral varus alignment of number 2 toe. R > L                                 Bilateral bunions L > R  Vascular: Normal Pulses/ NL Capillary refill, No evidence of DVT seen on physical exam.                        No calf swelling, no tenderness to calf muscles. Lymphatic:  No Evidence of Lymphedema. CHART REVIEW     Past Medical History:   Diagnosis Date    Thyroid activity decreased      Current Outpatient Prescriptions   Medication Sig    lidocaine, PF, (XYLOCAINE) 10 mg/mL (1 %) injection 1.5 mL of Lidocaine to first web space plantar aspect    thyroid, Pork, (ARMOUR THYROID) 90 mg tablet Take  by mouth daily.  Cetirizine (ZYRTEC) 10 mg cap Take  by mouth. No current facility-administered medications for this visit. Allergies   Allergen Reactions    Pcn [Penicillins] Itching    Sulfa (Sulfonamide Antibiotics) Angioedema     No past surgical history on file. Social History     Occupational History    Not on file. Social History Main Topics    Smoking status: Never Smoker    Smokeless tobacco: Never Used    Alcohol use Yes    Drug use: Not on file    Sexual activity: Not on file     Family History   Problem Relation Age of Onset    No Known Problems Mother     No Known Problems Father        REVIEW OF SYSTEMS : 1/23/2018  ALL BELOW ARE Negative except : SEE HPI       Constitutional: Negative for fever, chills and weight loss. Neg Weigh Loss  Cardiovascular: Negative for chest pain, claudication and leg swelling. SOB, RAPP   Gastrointestinal: Negative for  pain, N/V/D/C, Blood in stool or urine,dysuria, hematuria,        Incontinence, pelvic pain  Musculoskeletal: see HPI. Neurological: Negative for dizziness and weakness. Negative for headaches,Visual Changes, Confusion, Seizures,   Psychiatric/Behavioral: Negative for depression, memory loss and substance abuse. Extremities:  Negative for  hair changes, rash or skin lesion changes. Hematologic: Negative for Bleeding problems, bruising, pallor or swollen lymph nodes. Peripheral Vascular: No calf pain, vascular vein tenderness to calf pain              No calf throbbing, posterior knee throbbing pain    DIAGNOSTIC IMAGING     No notes on file    Written by Marvin Clement, as dictated by Lorenzo Swann MD. I, , Lorenzo Swann MD, confirm that all documentation is accurate.

## 2018-01-23 NOTE — PATIENT INSTRUCTIONS
Please follow up as needed. You are advised to contact us if your condition worsens. Foot Pain: Care Instructions  Your Care Instructions  Foot injuries that cause pain and swelling are fairly common. Almost all sports or home repair projects can cause a misstep that ends up as foot pain. Normal wear and tear, especially as you get older, also can cause foot pain. Most minor foot injuries will heal on their own, and home treatment is usually all you need to do. If you have a severe injury, you may need tests and treatment. Follow-up care is a key part of your treatment and safety. Be sure to make and go to all appointments, and call your doctor if you are having problems. It's also a good idea to know your test results and keep a list of the medicines you take. How can you care for yourself at home? · Take pain medicines exactly as directed. ¨ If the doctor gave you a prescription medicine for pain, take it as prescribed. ¨ If you are not taking a prescription pain medicine, ask your doctor if you can take an over-the-counter medicine. · Rest and protect your foot. Take a break from any activity that may cause pain. · Put ice or a cold pack on your foot for 10 to 20 minutes at a time. Put a thin cloth between the ice and your skin. · Prop up the sore foot on a pillow when you ice it or anytime you sit or lie down during the next 3 days. Try to keep it above the level of your heart. This will help reduce swelling. · Your doctor may recommend that you wrap your foot with an elastic bandage. Keep your foot wrapped for as long as your doctor advises. · If your doctor recommends crutches, use them as directed. · Wear roomy footwear. · As soon as pain and swelling end, begin gentle exercises of your foot. Your doctor can tell you which exercises will help. When should you call for help? Call 911 anytime you think you may need emergency care.  For example, call if:  ? · Your foot turns pale, white, blue, or cold. ?Call your doctor now or seek immediate medical care if:  ? · You cannot move or stand on your foot. ? · Your foot looks twisted or out of its normal position. ? · Your foot is not stable when you step down. ? · You have signs of infection, such as:  ¨ Increased pain, swelling, warmth, or redness. ¨ Red streaks leading from the sore area. ¨ Pus draining from a place on your foot. ¨ A fever. ? · Your foot is numb or tingly. ? Watch closely for changes in your health, and be sure to contact your doctor if:  ? · You do not get better as expected. ? · You have bruises from an injury that last longer than 2 weeks. Where can you learn more? Go to http://jannet-kaleb.info/. Enter H332 in the search box to learn more about \"Foot Pain: Care Instructions. \"  Current as of: March 21, 2017  Content Version: 11.4  © 4418-5370 [employer]. Care instructions adapted under license by [employer] (which disclaims liability or warranty for this information). If you have questions about a medical condition or this instruction, always ask your healthcare professional. [address] any warranty or liability for your use of this information.

## 2018-04-26 NOTE — MR AVS SNAPSHOT
Visit Information Date & Time Provider Department Dept. Phone Encounter #  
 11/22/2017  9:20 AM Rebecca Pool MD [facility] [phone] [id number] Upcoming Health Maintenance Date Due DTaP/Tdap/Td series (1 - Tdap) 12/4/1995 PAP AKA CERVICAL CYTOLOGY 5/8/2015 Influenza Age 5 to Adult 8/1/2017 Allergies as of 11/22/2017  Review Complete On: 11/22/2017 By: Jacqueline Valdez Severity Noted Reaction Type Reactions Pcn [Penicillins]  08/15/2017    Itching Sulfa (Sulfonamide Antibiotics)  08/15/2017    Angioedema Current Immunizations  Never Reviewed Name Date Tdap 8/15/2017  7:05 AM  
  
 Not reviewed this visit You Were Diagnosed With   
  
 Codes Comments Right foot pain    -  Primary ICD-10-CM: D52.496 ICD-9-CM: 729.5 Metatarsalgia of right foot     ICD-10-CM: M77.41 
ICD-9-CM: 726.70 Vitals BP Pulse Temp Resp Height(growth percentile) SpO2  
 (!) 118/94 92 98.2 °F (36.8 °C) (Oral) 18 5' 6\" (1.676 m) 99% OB Status Smoking Status Chemically Induced Never Smoker Vitals History Your Updated Medication List  
  
   
This list is accurate as of: 11/22/17 11:16 AM.  Always use your most recent med list.  
  
  
  
  
 ARMOUR THYROID 90 mg tablet Generic drug:  thyroid (Pork) Take  by mouth daily. ZyrTEC 10 mg Cap Generic drug:  Cetirizine Take  by mouth. We Performed the Following AMB POC XRAY, FOOT; COMPLETE, 3+ VIEW [83895 CPT(R)] AMB SUPPLY ORDER [[id number] Custom] Comments:  
 Custom trilaminar orthotic insert with a metatarsal pad To-Do List   
 11/22/2017 Imaging:  MRI FOOT RT WO CONT Patient Instructions Please follow up after MRI. You are advised to contact us if your condition worsens.  
  
You have been provided with an order for durable medical equipment that you may  at an outside facility as our office does not carry the equipment you need. You may pick it up at any medical supply company you like. Listed below are a few different locations for your convenience: [facility] [address] [facility], [address] Phone: [phone] [address] [place], [address] [facility] Phone: [phone] [address] Phone: [phone] [place]:  
[address] [place], [address] [place]: 
[address] [place], [address] [place]: 
[address] [facility], [address] Metatarsalgia: Care Instructions Your Care Instructions Metatarsalgia (say \"met-uh-tar-SAL-nedra-uh\") is pain in the ball of the foot. It sometimes spreads to the toes. The ball of the foot is the bottom of the foot, where the toes join the foot. While walking might be very painful, the pain is usually not a sign of a serious or permanent problem. But any pain can affect your life, so it is important that you treat it. Pain in this area can be caused by many things. For example, you may have this pain if you stand or walk a lot or wear tight shoes or high heels. Your pain might be caused by inflammation of a joint (capsulitis). It is most common in the joint at the base of the second toe, near the ball of the foot. Capsulitis happens when ligaments that go around the joint become inflamed. The joint may be swollen. It may feel like there is a small rock under it. You may have had an X-ray if your doctor wanted to make sure a more serious problem is not causing your pain. Treatment may consist of home care, such as rest, wearing different shoes, and taking over-the-counter pain medicines. It can take months for the pain to go away. If the ligaments around a joint are torn, or if a toe has started to slant toward the toe next to it, you may need surgery. Follow-up care is a key part of your treatment and safety. Be sure to make and go to all appointments, and call your doctor if you are having problems. It's also a good idea to know your test results and keep a list of the medicines you take. How can you care for yourself at home? · Rest your foot. If an activity is causing the pain, find another one to do that does not put so much pressure on your foot. · Put ice or a cold pack on your foot when it hurts or after you've done something that usually causes pain. Do this for 10 to 20 minutes at a time. Put a thin cloth between the ice and your skin. · Take an over-the-counter pain medicine, such as acetaminophen (Tylenol), ibuprofen (Advil, Motrin), or naproxen (Aleve). Be safe with medicines. Read and follow all instructions on the label. · Do not take two or more pain medicines at the same time unless the doctor told you to. Many pain medicines have acetaminophen, which is Tylenol. Too much acetaminophen (Tylenol) can be harmful. · Wear roomy, comfortable shoes. · If your doctor recommends it, use special pads to relieve the pressure on your foot. The pads may fit into your shoes, or they may stick to the soles of your feet. · Ask your doctor about using orthotic shoe devices. These are molded pieces of rubber, leather, metal, plastic, or other synthetic material that are inserted into a shoe. · Wear shoes with good arch support. · Try not to wear high heels or narrow shoes. · Follow your doctor's or physical therapist's directions for exercise. When should you call for help? Watch closely for changes in your health, and be sure to contact your doctor if: 
? · You have new or worse symptoms. ? · You do not get better as expected. Where can you learn more? Go to http://jannet-kaleb.info/. Enter J972 in the search box to learn more about \"Metatarsalgia: Care Instructions. \" Current as of: March 21, 2017 Content Version: 11.4 © 7144-4311 [employer]. Care instructions adapted under license by [employer] (which disclaims liability or warranty for this information). If you have questions about a medical condition or this instruction, always ask your healthcare professional. [address] any warranty or liability for your use of this information. Introducing [facility]! Caridad Bennett introduces [employer] patient portal. Now you can access parts of your medical record, email your doctor's office, and request medication refills online. 1. In your internet browser, go to [email or web address] 2. Click on the First Time User? Click Here link in the Sign In box. You will see the New Member Sign Up page. 3. Enter your [employer] Access Code exactly as it appears below. You will not need to use this code after youve completed the sign-up process. If you do not sign up before the expiration date, you must request a new code. · [employer] Access Code: [email or web address] Expires: 2/20/2018 11:16 AM 
 
4. Enter the last four digits of your Social Security Number (xxxx) and Date of Birth (mm/dd/yyyy) as indicated and click Submit. You will be taken to the next sign-up page. 5. Create a [employer] ID. This will be your [employer] login ID and cannot be changed, so think of one that is secure and easy to remember. 6. Create a [employer] password. You can change your password at any time. 7. Enter your Password Reset Question and Answer. This can be used at a later time if you forget your password. 8. Enter your e-mail address. You will receive e-mail notification when new information is available in [address] 9. Click Sign Up. You can now view and download portions of your medical record. 10. Click the Download Summary menu link to download a portable copy of your medical information.  
 
If you have questions, please visit the Frequently Asked Questions section of the [employer]. Remember, [employer] is NOT to be used for urgent needs. For medical emergencies, dial 911. Now available from your iPhone and Android! Please provide this summary of care documentation to your next provider. Your primary care clinician is listed as Nicholas Edwards. If you have any questions after today's visit, please call [phone]. LAC[place]

## 2019-01-07 ENCOUNTER — HOSPITAL ENCOUNTER (OUTPATIENT)
Dept: NON INVASIVE DIAGNOSTICS | Age: 44
Discharge: HOME | End: 2019-01-07
Attending: GENERAL PRACTICE
Payer: COMMERCIAL

## 2019-01-07 VITALS
BODY MASS INDEX: 26.84 KG/M2 | WEIGHT: 167 LBS | SYSTOLIC BLOOD PRESSURE: 105 MMHG | HEIGHT: 66 IN | DIASTOLIC BLOOD PRESSURE: 77 MMHG

## 2019-01-07 DIAGNOSIS — R06.00 DYSPNEA: ICD-10-CM

## 2019-01-07 LAB
ECHO AO ROOT DIAM: 2.91 CM
ECHO LA AREA 4C: 13.7 CM2
ECHO LA VOL 2C: 39.42 ML (ref 22–52)
ECHO LA VOL 4C: 29.69 ML (ref 22–52)
ECHO LA VOL BP: 37.44 ML (ref 22–52)
ECHO LA VOL/BSA BIPLANE: 20.21 ML/M2 (ref 16–28)
ECHO LA VOLUME INDEX A2C: 21.28 ML/M2 (ref 16–28)
ECHO LA VOLUME INDEX A4C: 16.03 ML/M2 (ref 16–28)
ECHO LV E' LATERAL VELOCITY: 14.69 CM/S
ECHO LV E' SEPTAL VELOCITY: 11.65 CM/S
ECHO LV INTERNAL DIMENSION DIASTOLIC: 3.95 CM (ref 3.9–5.3)
ECHO LV INTERNAL DIMENSION SYSTOLIC: 3.17 CM
ECHO LV IVSD: 0.76 CM (ref 0.6–0.9)
ECHO LV MASS 2D: 112.7 G (ref 67–162)
ECHO LV MASS INDEX 2D: 60.8 G/M2 (ref 43–95)
ECHO LV POSTERIOR WALL DIASTOLIC: 0.97 CM (ref 0.6–0.9)
ECHO LVOT DIAM: 2.03 CM
ECHO LVOT PEAK GRADIENT: 2.5 MMHG
ECHO LVOT PEAK VELOCITY: 78.69 CM/S
ECHO LVOT VTI: 13.87 CM
ECHO MV A VELOCITY: 56.94 CM/S
ECHO MV E DECELERATION TIME (DT): 222.3 MS
ECHO MV E VELOCITY: 0.62 CM/S
ECHO MV E/A RATIO: 0.01
ECHO MV E/E' LATERAL: 0.04
ECHO MV E/E' RATIO (AVERAGED): 0.05
ECHO MV E/E' SEPTAL: 0.05
ECHO PULMONARY ARTERY SYSTOLIC PRESSURE (PASP): 30 MMHG
ECHO RV TAPSE: 1.72 CM (ref 1.5–2)
ECHO TV REGURGITANT MAX VELOCITY: 234.14 CM/S
ECHO TV REGURGITANT PEAK GRADIENT: 21.9 MMHG

## 2019-01-07 PROCEDURE — 93306 TTE W/DOPPLER COMPLETE: CPT

## 2020-10-31 ENCOUNTER — HOSPITAL ENCOUNTER (EMERGENCY)
Age: 45
Discharge: LEFT BEFORE DISCHARGE | End: 2020-10-31
Payer: COMMERCIAL

## 2020-10-31 PROCEDURE — 75810000275 HC EMERGENCY DEPT VISIT NO LEVEL OF CARE

## 2021-08-18 ENCOUNTER — TRANSCRIBE ORDER (OUTPATIENT)
Dept: SCHEDULING | Age: 46
End: 2021-08-18

## 2021-08-18 DIAGNOSIS — J37.0 CHRONIC LARYNGITIS: ICD-10-CM

## 2021-08-18 DIAGNOSIS — K21.00 REFLUX ESOPHAGITIS: Primary | ICD-10-CM

## 2021-09-09 ENCOUNTER — HOSPITAL ENCOUNTER (OUTPATIENT)
Dept: GENERAL RADIOLOGY | Age: 46
Discharge: HOME | End: 2021-09-09
Attending: OTOLARYNGOLOGY
Payer: COMMERCIAL

## 2021-09-09 DIAGNOSIS — K21.00 REFLUX ESOPHAGITIS: ICD-10-CM

## 2021-09-09 DIAGNOSIS — J37.0 CHRONIC LARYNGITIS: ICD-10-CM

## 2021-09-09 PROCEDURE — 74220 X-RAY XM ESOPHAGUS 1CNTRST: CPT

## 2021-09-16 ENCOUNTER — HOSPITAL ENCOUNTER (OUTPATIENT)
Dept: CT IMAGING | Age: 46
Discharge: HOME | End: 2021-09-16
Attending: GENERAL PRACTICE
Payer: COMMERCIAL

## 2021-09-16 ENCOUNTER — TRANSCRIBE ORDER (OUTPATIENT)
Dept: SCHEDULING | Age: 46
End: 2021-09-16

## 2021-09-16 DIAGNOSIS — R10.30 ABDOMINAL PAIN, LOWER: ICD-10-CM

## 2021-09-16 DIAGNOSIS — R10.30 ABDOMINAL PAIN, LOWER: Primary | ICD-10-CM

## 2021-09-16 PROCEDURE — 74011000250 HC RX REV CODE- 250: Performed by: RADIOLOGY

## 2021-09-16 PROCEDURE — 74177 CT ABD & PELVIS W/CONTRAST: CPT

## 2021-09-16 PROCEDURE — 74011000636 HC RX REV CODE- 636: Performed by: RADIOLOGY

## 2021-09-16 RX ORDER — BARIUM SULFATE 20 MG/ML
900 SUSPENSION ORAL ONCE
Status: COMPLETED | OUTPATIENT
Start: 2021-09-16 | End: 2021-09-16

## 2021-09-16 RX ADMIN — IOPAMIDOL 100 ML: 755 INJECTION, SOLUTION INTRAVENOUS at 17:13

## 2021-09-16 RX ADMIN — BARIUM SULFATE 900 ML: 20 SUSPENSION ORAL at 17:13
